# Patient Record
Sex: MALE | Race: BLACK OR AFRICAN AMERICAN | NOT HISPANIC OR LATINO | Employment: FULL TIME | URBAN - METROPOLITAN AREA
[De-identification: names, ages, dates, MRNs, and addresses within clinical notes are randomized per-mention and may not be internally consistent; named-entity substitution may affect disease eponyms.]

---

## 2023-05-17 ENCOUNTER — HOSPITAL ENCOUNTER (EMERGENCY)
Facility: HOSPITAL | Age: 33
Discharge: HOME | End: 2023-05-18
Attending: EMERGENCY MEDICINE
Payer: COMMERCIAL

## 2023-05-17 VITALS
TEMPERATURE: 97.6 F | SYSTOLIC BLOOD PRESSURE: 119 MMHG | HEIGHT: 71 IN | OXYGEN SATURATION: 97 % | DIASTOLIC BLOOD PRESSURE: 71 MMHG | HEART RATE: 54 BPM | WEIGHT: 195 LBS | BODY MASS INDEX: 27.3 KG/M2 | RESPIRATION RATE: 18 BRPM

## 2023-05-17 DIAGNOSIS — K08.89 PAIN, DENTAL: Primary | ICD-10-CM

## 2023-05-17 PROCEDURE — 99282 EMERGENCY DEPT VISIT SF MDM: CPT

## 2023-05-18 RX ORDER — PENICILLIN V POTASSIUM 500 MG/1
500 TABLET ORAL 4 TIMES DAILY
Qty: 40 TABLET | Refills: 0 | Status: SHIPPED | OUTPATIENT
Start: 2023-05-18 | End: 2023-05-28

## 2023-05-18 RX ORDER — TRAMADOL HYDROCHLORIDE 50 MG/1
50 TABLET ORAL EVERY 6 HOURS PRN
Qty: 12 TABLET | Refills: 0 | Status: SHIPPED | OUTPATIENT
Start: 2023-05-18

## 2023-05-18 NOTE — FS[facility] PROVIDER NOTE
Subjective   History of Present Illness  Patient 32-year-old man who presents complaining of mild to moderate right lower dental pain which began several days ago and gradually worsening.  He noticed some swelling and then purulent drainage with decreased discomfort.  He denies any fevers or throat pain or nausea or vomiting.  Pain is worse with chewing.  He did contact the dentist and does have an appointment in 2 days.        Review of Systems    History reviewed. No pertinent past medical history.    No Known Allergies    History reviewed. No pertinent surgical history.    History reviewed. No pertinent family history.    Social History     Socioeconomic History   • Marital status: Single   Tobacco Use   • Smoking status: Never     Passive exposure: Never   • Smokeless tobacco: Never   Vaping Use   • Vaping Use: Never used   Substance and Sexual Activity   • Alcohol use: Yes     Comment: occ   • Drug use: Yes     Frequency: 2.0 times per week     Types: Marijuana   • Sexual activity: Defer           Objective   Physical Exam  Vitals and nursing note reviewed.   Constitutional:       General: He is in acute distress.      Appearance: Normal appearance. He is not ill-appearing or toxic-appearing.   HENT:      Head: Normocephalic and atraumatic.      Comments: Tenderness to the right lower jaw.  No gross swelling.     Mouth/Throat:      Mouth: Mucous membranes are moist.      Pharynx: Oropharynx is clear.      Comments: Right lower jaw tenderness.  There is slight gum swelling.  No purulent drainage at this time.  Floor the mouth is soft and nontender.  Eyes:      Extraocular Movements: Extraocular movements intact.      Pupils: Pupils are equal, round, and reactive to light.   Pulmonary:      Effort: Pulmonary effort is normal.   Musculoskeletal:         General: Normal range of motion.      Cervical back: Normal range of motion and neck supple. No rigidity or tenderness.   Lymphadenopathy:      Cervical: No  cervical adenopathy.   Skin:     General: Skin is warm and dry.      Capillary Refill: Capillary refill takes less than 2 seconds.   Neurological:      General: No focal deficit present.      Mental Status: He is alert.         Procedures           ED Course                                           MDM   Patient 32-year-old man complaining of right lower jaw and dental tenderness with associated gum swelling which has since opened up and drained.  He has had decreased pain since drainage.  No fevers or nausea or vomiting.  Patient does have an appoint with a dentist in 2 days.  He does not appear toxic.  He does have dental tenderness with slight gum swelling.  No facial erythema or gross swelling to the face.  Patient be placed on Pen-Vee K and tramadol.  A inspect report was attempted however no results were found with the criteria entered.    Final diagnoses:   Pain, dental       ED Disposition  ED Disposition     ED Disposition   Discharge    Condition   Stable    Comment   --             [facility] [address] [place] [id number] [phone]    If symptoms worsen         Medication List      New Prescriptions    penicillin v potassium 500 MG tablet  Commonly known as: VEETID  Take 1 tablet by mouth 4 (Four) Times a Day for 10 days.     traMADol 50 MG tablet  Commonly known as: ULTRAM  Take 1 tablet by mouth Every 6 (Six) Hours As Needed for Moderate Pain.           Where to Get Your Medications      These medications were sent to [facility]/pharmacy #[id number] - [place], [place] - [address] [place] [address] - [phone]  - [phone] FX  [address] [place] [address] [place] [address]    Hours: 24-hours Phone: [phone]   · penicillin v potassium 500 MG tablet  · traMADol 50 MG tablet

## 2023-05-18 NOTE — DISCHARGE INSTRUCTIONS
Please follow-up with your dentist as scheduled in 2 days.  Take medications as prescribed.  Seek immediate medical attention having worsening symptoms or any concerns.